# Patient Record
Sex: MALE | Race: ASIAN | NOT HISPANIC OR LATINO | URBAN - METROPOLITAN AREA
[De-identification: names, ages, dates, MRNs, and addresses within clinical notes are randomized per-mention and may not be internally consistent; named-entity substitution may affect disease eponyms.]

---

## 2019-06-19 ENCOUNTER — OFFICE (OUTPATIENT)
Dept: URBAN - METROPOLITAN AREA CLINIC 19 | Facility: CLINIC | Age: 44
End: 2019-06-19

## 2019-06-19 VITALS
WEIGHT: 164 LBS | HEIGHT: 68 IN | HEART RATE: 85 BPM | DIASTOLIC BLOOD PRESSURE: 72 MMHG | SYSTOLIC BLOOD PRESSURE: 140 MMHG

## 2019-06-19 DIAGNOSIS — K60.2 ANAL FISSURE, UNSPECIFIED: ICD-10-CM

## 2019-06-19 PROCEDURE — 99203 OFFICE O/P NEW LOW 30 MIN: CPT | Performed by: INTERNAL MEDICINE

## 2023-12-18 ENCOUNTER — OFFICE (OUTPATIENT)
Dept: URBAN - METROPOLITAN AREA CLINIC 19 | Facility: CLINIC | Age: 48
End: 2023-12-18

## 2023-12-18 VITALS
HEART RATE: 105 BPM | SYSTOLIC BLOOD PRESSURE: 167 MMHG | HEIGHT: 68 IN | DIASTOLIC BLOOD PRESSURE: 86 MMHG | WEIGHT: 160 LBS | OXYGEN SATURATION: 100 %

## 2023-12-18 DIAGNOSIS — R10.13 EPIGASTRIC PAIN: ICD-10-CM

## 2023-12-18 DIAGNOSIS — K59.00 CONSTIPATION, UNSPECIFIED: ICD-10-CM

## 2023-12-18 DIAGNOSIS — R14.0 ABDOMINAL DISTENSION (GASEOUS): ICD-10-CM

## 2023-12-18 PROCEDURE — 99204 OFFICE O/P NEW MOD 45 MIN: CPT | Performed by: NURSE PRACTITIONER

## 2023-12-18 RX ORDER — SODIUM PICOSULFATE, MAGNESIUM OXIDE, AND ANHYDROUS CITRIC ACID 10; 3.5; 12 MG/160ML; G/160ML; G/160ML
LIQUID ORAL
Qty: 350 | Refills: 0 | Status: COMPLETED
Start: 2023-12-18 | End: 2024-02-07

## 2023-12-18 NOTE — SERVICEHPINOTES
PREVIOUS HISTORY BY DR. GIANG 2019:janet gonzales Mr. Ritter is a 43 year old male with intermittent constipation (never on laxatives in the past). Here for a few weeks of perianal pain. Worse when passing stool. Very scant blood.  This began after passing a hard stool.  He recalls having pain immediately after passing that stool  Denies any family history of inflammatory bowel disease.  No personal history of any major GI conditions. 
janet gonzales   Recommendations included NTG ointment, follow-up in 8 weeks, fiber/stool softener for fissure.
janet gonzales UPDATE BY BENOIT BRIAN 12/18/23:
janet
br Mr. Ritter presents to clinic today with c/o epigastric burning, upper abdominal discomfort and constipation/bloating.
janet Acetates onset of symptoms was about 1 month ago.  He began having burning in the epigastric region, started to feel bloated.  Did start Nexium 20 mg over-the-counter and this did help his burning pain.  He is having irregular bowel movements, believes this is related to certain foods that he is eating.  He has been traveling recently all domestic.
br
janet  Some days he will have a good size BM, other days he will have to strain has a small volume BM.  No melena, hematochezia, nausea /vomiting, weight loss, reflux, dysphagia, odynophagia, diarrhea, rectal pain.
janet gonzalesNo alcohol use.  No well water at home.  No recent antibiotics or NSAID use.  No GI malignancies in his family.  He does have white coat syndrome and states his blood pressure at home is a systolic of 130.  He has never had a colonoscopy.

## 2023-12-18 NOTE — SERVICENOTES
Did not test for h. pylori, on Nexium. Will discuss with Dr. Soto if EGD warranted. May cancel and hold PPI x 2 weeks then do H. pylori if we cancel.

## 2023-12-19 LAB
HEPATIC FUNCTION PANEL (7): ALBUMIN: 5 G/DL (ref 4.1–5.1)
HEPATIC FUNCTION PANEL (7): ALKALINE PHOSPHATASE: 82 IU/L (ref 44–121)
HEPATIC FUNCTION PANEL (7): ALT (SGPT): 21 IU/L (ref 0–44)
HEPATIC FUNCTION PANEL (7): AST (SGOT): 28 IU/L (ref 0–40)
HEPATIC FUNCTION PANEL (7): BILIRUBIN, DIRECT: 0.11 MG/DL (ref 0–0.4)
HEPATIC FUNCTION PANEL (7): BILIRUBIN, TOTAL: 0.4 MG/DL (ref 0–1.2)
HEPATIC FUNCTION PANEL (7): PROTEIN, TOTAL: 8.5 G/DL (ref 6–8.5)
LIPASE: 23 U/L (ref 13–78)

## 2024-02-07 ENCOUNTER — AMBULATORY SURGICAL CENTER (OUTPATIENT)
Dept: URBAN - METROPOLITAN AREA SURGERY 2 | Facility: SURGERY | Age: 49
End: 2024-02-07
Payer: COMMERCIAL

## 2024-02-07 ENCOUNTER — OFFICE (OUTPATIENT)
Dept: URBAN - METROPOLITAN AREA PATHOLOGY 12 | Facility: PATHOLOGY | Age: 49
End: 2024-02-07
Payer: COMMERCIAL

## 2024-02-07 VITALS
SYSTOLIC BLOOD PRESSURE: 142 MMHG | TEMPERATURE: 97.6 F | HEART RATE: 119 BPM | TEMPERATURE: 98.1 F | OXYGEN SATURATION: 96 % | HEART RATE: 78 BPM | DIASTOLIC BLOOD PRESSURE: 86 MMHG | DIASTOLIC BLOOD PRESSURE: 90 MMHG | OXYGEN SATURATION: 100 % | HEART RATE: 77 BPM | HEIGHT: 68 IN | OXYGEN SATURATION: 96 % | HEART RATE: 119 BPM | SYSTOLIC BLOOD PRESSURE: 112 MMHG | OXYGEN SATURATION: 100 % | OXYGEN SATURATION: 95 % | SYSTOLIC BLOOD PRESSURE: 128 MMHG | OXYGEN SATURATION: 99 % | WEIGHT: 159 LBS | DIASTOLIC BLOOD PRESSURE: 66 MMHG | OXYGEN SATURATION: 95 % | TEMPERATURE: 97.6 F | TEMPERATURE: 98.1 F | RESPIRATION RATE: 18 BRPM | OXYGEN SATURATION: 100 % | SYSTOLIC BLOOD PRESSURE: 102 MMHG | DIASTOLIC BLOOD PRESSURE: 90 MMHG | DIASTOLIC BLOOD PRESSURE: 90 MMHG | HEIGHT: 68 IN | DIASTOLIC BLOOD PRESSURE: 91 MMHG | DIASTOLIC BLOOD PRESSURE: 58 MMHG | OXYGEN SATURATION: 95 % | RESPIRATION RATE: 16 BRPM | WEIGHT: 159 LBS | HEART RATE: 71 BPM | HEART RATE: 77 BPM | OXYGEN SATURATION: 99 % | TEMPERATURE: 97.6 F | DIASTOLIC BLOOD PRESSURE: 58 MMHG | HEART RATE: 78 BPM | DIASTOLIC BLOOD PRESSURE: 66 MMHG | SYSTOLIC BLOOD PRESSURE: 128 MMHG | SYSTOLIC BLOOD PRESSURE: 158 MMHG | SYSTOLIC BLOOD PRESSURE: 112 MMHG | HEART RATE: 119 BPM | SYSTOLIC BLOOD PRESSURE: 112 MMHG | DIASTOLIC BLOOD PRESSURE: 66 MMHG | HEIGHT: 68 IN | DIASTOLIC BLOOD PRESSURE: 86 MMHG | OXYGEN SATURATION: 99 % | DIASTOLIC BLOOD PRESSURE: 58 MMHG | RESPIRATION RATE: 16 BRPM | RESPIRATION RATE: 16 BRPM | SYSTOLIC BLOOD PRESSURE: 142 MMHG | OXYGEN SATURATION: 97 % | HEART RATE: 71 BPM | SYSTOLIC BLOOD PRESSURE: 158 MMHG | OXYGEN SATURATION: 96 % | RESPIRATION RATE: 18 BRPM | SYSTOLIC BLOOD PRESSURE: 102 MMHG | HEART RATE: 77 BPM | HEART RATE: 71 BPM | SYSTOLIC BLOOD PRESSURE: 102 MMHG | DIASTOLIC BLOOD PRESSURE: 91 MMHG | DIASTOLIC BLOOD PRESSURE: 91 MMHG | SYSTOLIC BLOOD PRESSURE: 142 MMHG | SYSTOLIC BLOOD PRESSURE: 158 MMHG | HEART RATE: 78 BPM | SYSTOLIC BLOOD PRESSURE: 128 MMHG | DIASTOLIC BLOOD PRESSURE: 86 MMHG | TEMPERATURE: 98.1 F | OXYGEN SATURATION: 97 % | RESPIRATION RATE: 18 BRPM | OXYGEN SATURATION: 97 % | WEIGHT: 159 LBS

## 2024-02-07 DIAGNOSIS — K31.89 OTHER DISEASES OF STOMACH AND DUODENUM: ICD-10-CM

## 2024-02-07 DIAGNOSIS — Z12.11 ENCOUNTER FOR SCREENING FOR MALIGNANT NEOPLASM OF COLON: ICD-10-CM

## 2024-02-07 DIAGNOSIS — R10.13 EPIGASTRIC PAIN: ICD-10-CM

## 2024-02-07 DIAGNOSIS — K21.9 GASTRO-ESOPHAGEAL REFLUX DISEASE WITHOUT ESOPHAGITIS: ICD-10-CM

## 2024-02-07 DIAGNOSIS — K63.5 POLYP OF COLON: ICD-10-CM

## 2024-02-07 DIAGNOSIS — D12.0 BENIGN NEOPLASM OF CECUM: ICD-10-CM

## 2024-02-07 PROCEDURE — 45380 COLONOSCOPY AND BIOPSY: CPT | Mod: 33 | Performed by: INTERNAL MEDICINE

## 2024-02-07 PROCEDURE — 43239 EGD BIOPSY SINGLE/MULTIPLE: CPT | Mod: 51 | Performed by: INTERNAL MEDICINE

## 2024-02-07 PROCEDURE — 88305 TISSUE EXAM BY PATHOLOGIST: CPT | Mod: TC | Performed by: STUDENT IN AN ORGANIZED HEALTH CARE EDUCATION/TRAINING PROGRAM

## 2024-02-12 LAB
GASTRO ONE PATHOLOGY: PDF REPORT: (no result)

## 2024-03-26 ENCOUNTER — OFFICE (OUTPATIENT)
Dept: URBAN - METROPOLITAN AREA CLINIC 19 | Facility: CLINIC | Age: 49
End: 2024-03-26

## 2024-03-26 VITALS
HEIGHT: 68 IN | SYSTOLIC BLOOD PRESSURE: 140 MMHG | WEIGHT: 159 LBS | DIASTOLIC BLOOD PRESSURE: 82 MMHG | OXYGEN SATURATION: 99 % | HEART RATE: 92 BPM

## 2024-03-26 DIAGNOSIS — K59.00 CONSTIPATION, UNSPECIFIED: ICD-10-CM

## 2024-03-26 DIAGNOSIS — K30 FUNCTIONAL DYSPEPSIA: ICD-10-CM

## 2024-03-26 DIAGNOSIS — R10.13 EPIGASTRIC PAIN: ICD-10-CM

## 2024-03-26 DIAGNOSIS — R14.0 ABDOMINAL DISTENSION (GASEOUS): ICD-10-CM

## 2024-03-26 PROCEDURE — 99214 OFFICE O/P EST MOD 30 MIN: CPT | Performed by: NURSE PRACTITIONER

## 2024-03-26 NOTE — SERVICEHPINOTES
PREVIOUS HISTORY BY DR. GIANG 2019:Mr. Ritter is a 43 year old male with intermittent constipation (never on laxatives in the past). Here for a few weeks of perianal pain. Worse when passing stool. Very scant blood.  This began after passing a hard stool.  He recalls having pain immediately after passing that stool  Denies any family history of inflammatory bowel disease.  No personal history of any major GI conditions.Recommendations included NTG ointment, follow-up in 8 weeks, fiber/stool softener for fissure.UPDATE BY BENOIT BRIAN 12/18/23:Mr. Ritter presents to clinic today with c/o epigastric burning, upper abdominal discomfort and constipation/bloating.States onset of symptoms was about 1 month ago.  He began having burning in the epigastric region, started to feel bloated.  Did start Nexium 20 mg over-the-counter and this did help his burning pain.  He is having irregular bowel movements, believes this is related to certain foods that he is eating.  He has been traveling recently all domestic. Some days he will have a good size BM, other days he will have to strain has a small volume BM.  No melena, hematochezia, nausea /vomiting, weight loss, reflux, dysphagia, odynophagia, diarrhea, rectal pain.No alcohol use.  No well water at home.  No recent antibiotics or NSAID use.  No GI malignancies in his family.  He does have white coat syndrome and states his blood pressure at home is a systolic of 130.  He has never had a colonoscopy. 
br
br   Recommendations included GERD diet, lipase, LFTs, Nexium OTC, EGD/colonoscopy. His labs were normal.
br
br EGD/COLONOSCOPY BY DR. GIANG 2/2024:
br Impressionsbr- Blunted villi in the second part of the duodenum. (Biopsy)br- Polyp (3 mm) in the cecum. (Polypectomy)br- Normal stomachbr- Normal esophagus
br PATH: 1 TA, no evidence of celiac sprue 
br
br UPDATE BY BENOIT BRIAN 3/26/24:
br
br Mr. Ritter presents to clinic today with continued symptoms, bloating and mid upper abdominal discomfort (epigastric). Recently located in RUQ. It does occur postprandial, depends on what he eats. Typically delayed. He consumes very little dairy.  He does have a bowel movement every day, sometimes small volume and other times large volume.  His bowel movements are actually better overall considering his constipation.  He does not take any NSAIDs.
br
brNo melena, hematochezia, hematemesis, coffee-ground emesis, rectal pain, diarrhea, early satiety, dysphagia, odynophagia, vomiting, anorexia, unintentional weight loss.  We discussed this may be diet related/functional dyspepsia

## 2024-04-09 ENCOUNTER — OFFICE (OUTPATIENT)
Dept: URBAN - METROPOLITAN AREA CLINIC 11 | Facility: CLINIC | Age: 49
End: 2024-04-09

## 2024-04-09 DIAGNOSIS — R14.0 ABDOMINAL DISTENSION (GASEOUS): ICD-10-CM

## 2024-04-09 DIAGNOSIS — K59.00 CONSTIPATION, UNSPECIFIED: ICD-10-CM

## 2024-04-09 PROCEDURE — 91065 BREATH HYDROGEN/METHANE TEST: CPT | Performed by: INTERNAL MEDICINE

## 2024-05-24 ENCOUNTER — OFFICE (OUTPATIENT)
Dept: URBAN - METROPOLITAN AREA CLINIC 19 | Facility: CLINIC | Age: 49
End: 2024-05-24

## 2024-05-24 VITALS
DIASTOLIC BLOOD PRESSURE: 82 MMHG | OXYGEN SATURATION: 100 % | HEIGHT: 68 IN | SYSTOLIC BLOOD PRESSURE: 132 MMHG | HEART RATE: 78 BPM | WEIGHT: 160 LBS

## 2024-05-24 DIAGNOSIS — K58.1 IRRITABLE BOWEL SYNDROME WITH CONSTIPATION: ICD-10-CM

## 2024-05-24 DIAGNOSIS — R74.01 ELEVATION OF LEVELS OF LIVER TRANSAMINASE LEVELS: ICD-10-CM

## 2024-05-24 PROCEDURE — 99213 OFFICE O/P EST LOW 20 MIN: CPT | Performed by: INTERNAL MEDICINE
